# Patient Record
Sex: MALE | Race: WHITE | NOT HISPANIC OR LATINO | Employment: OTHER | URBAN - METROPOLITAN AREA
[De-identification: names, ages, dates, MRNs, and addresses within clinical notes are randomized per-mention and may not be internally consistent; named-entity substitution may affect disease eponyms.]

---

## 2017-07-18 PROBLEM — F32.0 MILD SINGLE CURRENT EPISODE OF MAJOR DEPRESSIVE DISORDER: Status: ACTIVE | Noted: 2017-07-18

## 2017-07-18 PROBLEM — E78.5 HYPERLIPIDEMIA ASSOCIATED WITH TYPE 2 DIABETES MELLITUS: Status: ACTIVE | Noted: 2017-07-18

## 2017-07-18 PROBLEM — E11.9 TYPE 2 DIABETES MELLITUS WITHOUT COMPLICATION, WITHOUT LONG-TERM CURRENT USE OF INSULIN: Status: ACTIVE | Noted: 2017-07-18

## 2017-07-18 PROBLEM — E11.69 HYPERLIPIDEMIA ASSOCIATED WITH TYPE 2 DIABETES MELLITUS: Status: ACTIVE | Noted: 2017-07-18

## 2018-04-18 PROBLEM — M17.10 ARTHRITIS OF KNEE: Status: ACTIVE | Noted: 2018-04-18

## 2018-04-18 PROBLEM — G47.00 INSOMNIA: Status: ACTIVE | Noted: 2018-04-18

## 2019-01-09 PROBLEM — E11.59 HYPERTENSION ASSOCIATED WITH DIABETES: Status: ACTIVE | Noted: 2019-01-09

## 2019-01-09 PROBLEM — I15.2 HYPERTENSION ASSOCIATED WITH DIABETES: Status: ACTIVE | Noted: 2019-01-09

## 2022-01-31 ENCOUNTER — PATIENT MESSAGE (OUTPATIENT)
Dept: ENDOSCOPY | Facility: HOSPITAL | Age: 67
End: 2022-01-31
Payer: MEDICARE

## 2022-01-31 RX ORDER — SOD SULF/POT CHLORIDE/MAG SULF 1.479 G
12 TABLET ORAL DAILY
Qty: 24 TABLET | Refills: 0 | Status: SHIPPED | OUTPATIENT
Start: 2022-01-31 | End: 2023-12-28

## 2022-02-24 ENCOUNTER — HOSPITAL ENCOUNTER (OUTPATIENT)
Facility: HOSPITAL | Age: 67
Discharge: HOME | End: 2022-02-24
Attending: INTERNAL MEDICINE | Admitting: INTERNAL MEDICINE
Payer: MEDICARE

## 2022-02-24 ENCOUNTER — ANESTHESIA (OUTPATIENT)
Dept: ENDOSCOPY | Facility: HOSPITAL | Age: 67
End: 2022-02-24
Payer: MEDICARE

## 2022-02-24 ENCOUNTER — ANESTHESIA EVENT (OUTPATIENT)
Dept: ENDOSCOPY | Facility: HOSPITAL | Age: 67
End: 2022-02-24
Payer: MEDICARE

## 2022-02-24 VITALS
SYSTOLIC BLOOD PRESSURE: 107 MMHG | OXYGEN SATURATION: 94 % | RESPIRATION RATE: 20 BRPM | WEIGHT: 245 LBS | TEMPERATURE: 99 F | HEIGHT: 74 IN | DIASTOLIC BLOOD PRESSURE: 61 MMHG | HEART RATE: 65 BPM | BODY MASS INDEX: 31.44 KG/M2

## 2022-02-24 LAB
CTP QC/QA: YES
GLUCOSE SERPL-MCNC: 143 MG/DL (ref 70–110)
SARS-COV-2 AG RESP QL IA.RAPID: NEGATIVE

## 2022-02-24 PROCEDURE — G0121 COLON CA SCRN NOT HI RSK IND: HCPCS | Mod: HCWC,,, | Performed by: INTERNAL MEDICINE

## 2022-02-24 PROCEDURE — 37000008 HC ANESTHESIA 1ST 15 MINUTES: Mod: HCWC | Performed by: INTERNAL MEDICINE

## 2022-02-24 PROCEDURE — G0121 COLON CA SCRN NOT HI RSK IND: HCPCS | Mod: HCWC | Performed by: INTERNAL MEDICINE

## 2022-02-24 PROCEDURE — 25000003 PHARM REV CODE 250: Mod: HCWC | Performed by: NURSE ANESTHETIST, CERTIFIED REGISTERED

## 2022-02-24 PROCEDURE — 63600175 PHARM REV CODE 636 W HCPCS: Mod: HCWC | Performed by: NURSE ANESTHETIST, CERTIFIED REGISTERED

## 2022-02-24 PROCEDURE — 37000009 HC ANESTHESIA EA ADD 15 MINS: Mod: HCWC | Performed by: INTERNAL MEDICINE

## 2022-02-24 PROCEDURE — G0121 COLON CA SCRN NOT HI RSK IND: ICD-10-PCS | Mod: HCWC,,, | Performed by: INTERNAL MEDICINE

## 2022-02-24 RX ORDER — LIDOCAINE HCL/PF 100 MG/5ML
SYRINGE (ML) INTRAVENOUS
Status: DISCONTINUED | OUTPATIENT
Start: 2022-02-24 | End: 2022-02-24

## 2022-02-24 RX ORDER — PROPOFOL 10 MG/ML
VIAL (ML) INTRAVENOUS
Status: DISCONTINUED | OUTPATIENT
Start: 2022-02-24 | End: 2022-02-24

## 2022-02-24 RX ADMIN — PROPOFOL 50 MG: 10 INJECTION, EMULSION INTRAVENOUS at 07:02

## 2022-02-24 RX ADMIN — PROPOFOL 50 MG: 10 INJECTION, EMULSION INTRAVENOUS at 06:02

## 2022-02-24 RX ADMIN — PROPOFOL 100 MG: 10 INJECTION, EMULSION INTRAVENOUS at 06:02

## 2022-02-24 RX ADMIN — SODIUM CHLORIDE, POTASSIUM CHLORIDE, SODIUM LACTATE AND CALCIUM CHLORIDE: 600; 310; 30; 20 INJECTION, SOLUTION INTRAVENOUS at 06:02

## 2022-02-24 RX ADMIN — LIDOCAINE HYDROCHLORIDE 50 MG: 20 INJECTION, SOLUTION INTRAVENOUS at 06:02

## 2022-02-24 NOTE — ANESTHESIA PREPROCEDURE EVALUATION
02/24/2022  Raghav Zhou is a 66 y.o., male.      Pre-op Assessment    I have reviewed the Patient Summary Reports.    I have reviewed the NPO Status.   I have reviewed the Medications.     Review of Systems  Anesthesia Hx:  Denies Family Hx of Anesthesia complications.   Denies Personal Hx of Anesthesia complications.   Social:  Non-Smoker    Hematology/Oncology:  Hematology Normal   Oncology Normal     EENT/Dental:EENT/Dental Normal   Cardiovascular:   Hypertension, well controlled ECG has been reviewed.  Hypertension, Essential Hypertension    Pulmonary:  Pulmonary Normal    Renal/:  Renal/ Normal     Hepatic/GI:  Hepatic/GI Normal    Musculoskeletal:  Musculoskeletal Normal    Neurological:  Neurology Normal    Endocrine:   Diabetes, type 2    Dermatological:  Skin Normal    Psych:   Psychiatric History          Physical Exam  General: Well nourished    Airway:  Mallampati: II   Mouth Opening: Normal  TM Distance: Normal  Neck ROM: Normal ROM    Chest/Lungs:  Clear to auscultation    Heart:  Rate: Normal  Rhythm: Regular Rhythm        Anesthesia Plan  Type of Anesthesia, risks & benefits discussed:    Anesthesia Type: MAC  Intra-op Monitoring Plan: Standard ASA Monitors  Induction:  IV  ASA Score: 2    Ready For Surgery From Anesthesia Perspective.     .

## 2022-02-24 NOTE — ANESTHESIA POSTPROCEDURE EVALUATION
Anesthesia Post Evaluation    Patient: Raghav Zhou    Procedure(s) Performed: Procedure(s) (LRB):  COLONOSCOPY (N/A)    Final Anesthesia Type: MAC      Patient location during evaluation: GI PACU  Patient participation: Yes- Able to Participate  Level of consciousness: awake and alert  Post-procedure vital signs: reviewed and stable  Pain management: adequate  Airway patency: patent    PONV status at discharge: No PONV  Anesthetic complications: no      Cardiovascular status: blood pressure returned to baseline  Respiratory status: room air, spontaneous ventilation and unassisted  Hydration status: euvolemic  Follow-up not needed.          Vitals Value Taken Time   BP  02/24/22 0735   Temp  02/24/22 0735   Pulse  02/24/22 0735   Resp  02/24/22 0735   SpO2  02/24/22 0735         No case tracking events are documented in the log.      Pain/Maxim Score: Maxim Score: 9 (2/24/2022  7:33 AM)

## 2022-02-24 NOTE — PROVATION PATIENT INSTRUCTIONS
Discharge Summary/Instructions after an Endoscopic Procedure  Patient Name: Raghav Zhou  Patient MRN: [id number]  Patient YOB: 1955 Thursday, February 24, 2022 Kayli Stevenson MD  Dear patient,  As a result of recent federal legislation (The Federal Cures Act), you may   receive lab or pathology results from your procedure in your [facility]   account before your physician is able to contact you. Your physician or   their representative will relay the results to you with their   recommendations at their soonest availability.  Thank you,  RESTRICTIONS:  During your procedure today, you received medications for sedation.  These   medications may affect your judgment, balance and coordination.  Therefore,   for 24 hours, you have the following restrictions:   - DO NOT drive a car, operate machinery, make legal/financial decisions,   sign important papers or drink alcohol.    ACTIVITY:  Today: no heavy lifting, straining or running due to procedural   sedation/anesthesia.  The following day: return to full activity including work.  DIET:  Eat and drink normally unless instructed otherwise.     TREATMENT FOR COMMON SIDE EFFECTS:  - Mild abdominal pain, nausea, belching, bloating or excessive gas:  rest,   eat lightly and use a heating pad.  - Sore Throat: treat with throat lozenges and/or gargle with warm salt   water.  - Because air was used during the procedure, expelling large amounts of air   from your rectum or belching is normal.  - If a bowel prep was taken, you may not have a bowel movement for 1-3 days.    This is normal.  SYMPTOMS TO WATCH FOR AND REPORT TO YOUR PHYSICIAN:  1. Abdominal pain or bloating, other than gas cramps.  2. Chest pain.  3. Back pain.  4. Signs of infection such as: chills or fever occurring within 24 hours   after the procedure.  5. Rectal bleeding, which would show as bright red, maroon, or black stools.   (A tablespoon of blood from the rectum is not serious, especially if    hemorrhoids are present.)  6. Vomiting.  7. Weakness or dizziness.  GO DIRECTLY TO THE NEAREST EMERGENCY ROOM IF YOU HAVE ANY OF THE FOLLOWING:      Difficulty breathing              Chills and/or fever over 101 F   Persistent vomiting and/or vomiting blood   Severe abdominal pain   Severe chest pain   Black, tarry stools   Bleeding- more than one tablespoon   Any other symptom or condition that you feel may need urgent attention  Your doctor recommends these additional instructions:  If any biopsies were taken, your doctors clinic will contact you in 1 to 2   weeks with any results.  - Patient has a contact number available for emergencies.  The signs and   symptoms of potential delayed complications were discussed with the   patient.  Return to normal activities tomorrow.  Written discharge   instructions were provided to the patient.   - Discharge patient to home (via wheelchair).   - Resume previous diet today.   - Continue present medications.   - Repeat colonoscopy in 6 months because the bowel preparation was poor.  For questions, problems or results please call your physician Kayli Stevenson MD at Work:  [phone]  If you have any questions about the above instructions, call the GI   department at [phone] or call the endoscopy unit at [phone]   from 7am until 3 pm.  [facility] [place], EMERGENCY ROOM PHONE NUMBER:   [phone]  IF A COMPLICATION OR EMERGENCY SITUATION ARISES AND YOU ARE UNABLE TO REACH   YOUR PHYSICIAN - GO DIRECTLY TO THE EMERGENCY ROOM.  I have read or have had read to me these discharge instructions for my   procedure and have received a written copy.  I understand these   instructions and will follow-up with my physician if I have any questions.     __________________________________       _____________________________________  Nurse Signature                                          Patient/Designated   Responsible Party Signature  Kayli Stevenson  MD Kayli Stevenson MD  2/24/2022 7:34:54 AM  This report has been verified and signed electronically.  Dear patient,  As a result of recent federal legislation (The Federal Cures Act), you may   receive lab or pathology results from your procedure in your [facility]   account before your physician is able to contact you. Your physician or   their representative will relay the results to you with their   recommendations at their soonest availability.  Thank you,  PROVATION

## 2022-02-24 NOTE — H&P
PRE PROCEDURE H&P    Patient Name: Raghav Zhou  MRN: [id number]  : 1955  Date of Procedure:  2022  Referring Physician: Hilario Cochran MD  Primary Physician: Hilario Cochran MD  Procedure Physician: Kayli Stevenson MD       Planned Procedure: Colonoscopy  Diagnosis: screening for colon cancer  Chief Complaint: Same as above    HPI: Patient is an 66 y.o. male is here for the above.     Last colonoscopy:   Family history: negative   Anticoagulation: none     Past Medical History:   Past Medical History:   Diagnosis Date    Hyperlipidemia     Hypertension     Type 2 diabetes mellitus without complication, without long-term current use of insulin 2017        Past Surgical History:  Past Surgical History:   Procedure Laterality Date    KNEE ARTHROSCOPY      L knee    TRIGGER FINGER RELEASE Left         Home Medications:  Prior to Admission medications    Medication Sig Start Date End Date Taking? Authorizing Provider   losartan-hydrochlorothiazide 100-25 mg (HYZAAR) 100-25 mg per tablet Take 1 tablet by mouth once daily. 21  Yes Hilario Cochran MD   metFORMIN (GLUCOPHAGE) 1000 MG tablet Take 1 tablet (1,000 mg total) by mouth 2 (two) times daily with meals. 21  Yes Hilario Cochran MD   rosuvastatin (CRESTOR) 20 MG tablet Take 1 tablet (20 mg total) by mouth every evening. 21  Yes Hilario Cochran MD   sod sulf-pot chloride-mag sulf (SUTAB) 1.479-0.188- 0.225 gram tablet Take 12 tablets by mouth once daily. Take according to package instructions with indicated amount of water. 22  Yes Ihsan Brar PA-C   traZODone (DESYREL) 150 MG tablet Take 1 tablet (150 mg total) by mouth every evening. 21 Yes Hilario Cochran MD   venlafaxine (EFFEXOR-XR) 150 MG Cp24 Take 1 capsule (150 mg total) by mouth once daily. 21  Yes Hilario Cochran MD   zolpidem (AMBIEN) 5 MG Tab Take 1 tablet (5 mg total) by mouth nightly as needed (insomnia). 22  Yes Hilario Cochran MD   aspirin  "(ECOTRIN) 81 MG EC tablet Take 1 tablet (81 mg total) by mouth once daily. 7/18/17 7/22/21  Hilario Cochran MD   sildenafiL (VIAGRA) 100 MG tablet Take 1 tablet (100 mg total) by mouth as needed for Erectile Dysfunction. 1/8/21 1/8/22  Hilario Cochran MD        Allergies:  Review of patient's allergies indicates:   Allergen Reactions    Lipitor [atorvastatin]         Social History:   Social History     Socioeconomic History    Marital status:    Tobacco Use    Smoking status: Never Smoker    Smokeless tobacco: Never Used   Substance and Sexual Activity    Alcohol use: No    Drug use: No       Family History:  History reviewed. No pertinent family history.    ROS: No acute cardiac events, no acute respiratory complaints.     Physical Exam (all patients):    BP (!) 152/81   Pulse 76   Temp 98.8 °F (37.1 °C) (Temporal)   Resp 14   Ht 6' 2" (1.88 m)   Wt 111.1 kg (245 lb)   SpO2 97%   BMI 31.46 kg/m²   Lungs: Clear to auscultation bilaterally, respirations unlabored  Heart: Regular rate and rhythm, S1 and S2 normal, no obvious murmurs  Abdomen:         Soft, non-tender, bowel sounds normal, no masses, no organomegaly    Lab Results   Component Value Date    WBC 8.4 01/24/2022    MCV 89 01/24/2022    RDW 12.4 01/24/2022     01/24/2022    INR 1.0 06/19/2015     (H) 01/24/2022    HGBA1C 6.4 (H) 01/24/2022    BUN 18 01/24/2022     01/24/2022    K 4.3 01/24/2022     01/24/2022        SEDATION PLAN: per anesthesia      History reviewed, vital signs satisfactory, cardiopulmonary status satisfactory, sedation options, risks and plans have been discussed with the patient  All their questions were answered and the patient agrees to the sedation procedures as planned and the patient is deemed an appropriate candidate for the sedation as planned.    Procedure explained to patient, informed consent obtained and placed in chart.    Kayli Stevenson  2/24/2022  6:51 AM   "

## 2022-02-24 NOTE — TRANSFER OF CARE
"Anesthesia Transfer of Care Note    Patient: Raghav Zhou    Procedure(s) Performed: Procedure(s) (LRB):  COLONOSCOPY (N/A)    Patient location: GI    Anesthesia Type: MAC    Transport from OR: Transported from OR on room air with adequate spontaneous ventilation    Post pain: adequate analgesia    Post assessment: no apparent anesthetic complications    Post vital signs: stable    Level of consciousness: awake and alert    Nausea/Vomiting: no nausea/vomiting    Complications: none    Transfer of care protocol was followed      Last vitals:   Visit Vitals  BP (!) 152/81   Pulse 76   Temp 37.1 °C (98.8 °F) (Temporal)   Resp 14   Ht 6' 2" (1.88 m)   Wt 111.1 kg (245 lb)   SpO2 97%   BMI 31.46 kg/m²     "

## 2022-03-29 LAB — POCT GLUCOSE: 143 MG/DL (ref 70–110)

## 2022-04-07 ENCOUNTER — OFFICE VISIT (OUTPATIENT)
Dept: OPHTHALMOLOGY | Facility: CLINIC | Age: 67
End: 2022-04-07
Payer: MEDICARE

## 2022-04-07 DIAGNOSIS — E11.9 DIABETES MELLITUS TYPE 2 WITHOUT RETINOPATHY: Primary | ICD-10-CM

## 2022-04-07 DIAGNOSIS — E11.36 DIABETIC CATARACT: ICD-10-CM

## 2022-04-07 DIAGNOSIS — H52.7 REFRACTIVE ERRORS: ICD-10-CM

## 2022-04-07 DIAGNOSIS — Z46.0 ENCOUNTER FOR FITTING OR ADJUSTMENT OF SPECTACLES OR CONTACT LENSES: ICD-10-CM

## 2022-04-07 PROCEDURE — 1159F PR MEDICATION LIST DOCUMENTED IN MEDICAL RECORD: ICD-10-PCS | Mod: CPTII,S$GLB,, | Performed by: OPTOMETRIST

## 2022-04-07 PROCEDURE — 92015 DETERMINE REFRACTIVE STATE: CPT | Mod: S$GLB,,, | Performed by: OPTOMETRIST

## 2022-04-07 PROCEDURE — 3061F PR NEG MICROALBUMINURIA RESULT DOCUMENTED/REVIEW: ICD-10-PCS | Mod: CPTII,S$GLB,, | Performed by: OPTOMETRIST

## 2022-04-07 PROCEDURE — 92015 PR REFRACTION: ICD-10-PCS | Mod: S$GLB,,, | Performed by: OPTOMETRIST

## 2022-04-07 PROCEDURE — 3066F NEPHROPATHY DOC TX: CPT | Mod: CPTII,S$GLB,, | Performed by: OPTOMETRIST

## 2022-04-07 PROCEDURE — 3066F PR DOCUMENTATION OF TREATMENT FOR NEPHROPATHY: ICD-10-PCS | Mod: CPTII,S$GLB,, | Performed by: OPTOMETRIST

## 2022-04-07 PROCEDURE — 99999 PR PBB SHADOW E&M-EST. PATIENT-LVL I: ICD-10-PCS | Mod: PBBFAC,,, | Performed by: OPTOMETRIST

## 2022-04-07 PROCEDURE — 92004 COMPRE OPH EXAM NEW PT 1/>: CPT | Mod: S$GLB,,, | Performed by: OPTOMETRIST

## 2022-04-07 PROCEDURE — 2023F DILAT RTA XM W/O RTNOPTHY: CPT | Mod: CPTII,S$GLB,, | Performed by: OPTOMETRIST

## 2022-04-07 PROCEDURE — 92004 PR EYE EXAM, NEW PATIENT,COMPREHESV: ICD-10-PCS | Mod: S$GLB,,, | Performed by: OPTOMETRIST

## 2022-04-07 PROCEDURE — 1159F MED LIST DOCD IN RCRD: CPT | Mod: CPTII,S$GLB,, | Performed by: OPTOMETRIST

## 2022-04-07 PROCEDURE — 92310 CONTACT LENS FITTING OU: CPT | Mod: CSM,S$GLB,, | Performed by: OPTOMETRIST

## 2022-04-07 PROCEDURE — 3061F NEG MICROALBUMINURIA REV: CPT | Mod: CPTII,S$GLB,, | Performed by: OPTOMETRIST

## 2022-04-07 PROCEDURE — 2023F PR DILATED RETINAL EXAM W/O EVID OF RETINOPATHY: ICD-10-PCS | Mod: CPTII,S$GLB,, | Performed by: OPTOMETRIST

## 2022-04-07 PROCEDURE — 92310 PR CONTACT LENS FITTING (NO CHANGE): ICD-10-PCS | Mod: CSM,S$GLB,, | Performed by: OPTOMETRIST

## 2022-04-07 PROCEDURE — 99999 PR PBB SHADOW E&M-EST. PATIENT-LVL I: CPT | Mod: PBBFAC,,, | Performed by: OPTOMETRIST

## 2022-04-07 NOTE — PROGRESS NOTES
HPI     New Patient Annual Diabetic Eye Exam   No Visual Complaints   Update Glasses and Contacts Rx  .Lab Results       Component                Value               Date                       LABA1C                   7.1                 02/03/2017                 HGBA1C                   6.4 (H)             01/24/2022                Last edited by Rishi Banerjee, OD on 4/7/2022  4:18 PM. (History)            Assessment /Plan     For exam results, see Encounter Report.    Diabetes mellitus type 2 without retinopathy    Diabetic cataract    Encounter for fitting or adjustment of spectacles or contact lenses    Refractive errors      No Background Diabetic Retinopathy    Moderate cataracts OU, not surgical    Discussed CL charges.  Dispense Final Rx for glasses  No changes CL Rx  RTC 1 year  Discussed above and answered questions.

## 2022-04-21 DIAGNOSIS — M99.01 CERVICAL (NECK) REGION SOMATIC DYSFUNCTION: Primary | ICD-10-CM

## 2022-04-21 DIAGNOSIS — M54.50 LUMBAR PAIN: ICD-10-CM

## 2022-04-21 DIAGNOSIS — M25.519 SHOULDER PAIN: ICD-10-CM

## 2022-05-02 ENCOUNTER — CLINICAL SUPPORT (OUTPATIENT)
Dept: REHABILITATION | Facility: HOSPITAL | Age: 67
End: 2022-05-02
Attending: ORTHOPAEDIC SURGERY
Payer: MEDICARE

## 2022-05-02 DIAGNOSIS — R52 PAIN: ICD-10-CM

## 2022-05-02 DIAGNOSIS — M99.01 CERVICAL (NECK) REGION SOMATIC DYSFUNCTION: ICD-10-CM

## 2022-05-02 PROCEDURE — 97161 PT EVAL LOW COMPLEX 20 MIN: CPT

## 2022-05-02 NOTE — PLAN OF CARE
[facility]  Physical Therapy Initial Evaluation    Name: Raghav Zhou  Clinic Number: [id number]    Therapy Diagnosis:   Encounter Diagnoses   Name Primary?    Cervical (neck) region somatic dysfunction     Pain      Physician: Lazaro Sanchez MD    Physician Orders: PT Eval and Treat   Medical Diagnosis from Referral:Cervical (neck) region somatic dysfunction  Evaluation Date: 5/2/2022  Authorization Period Expiration:   05/20/2022  Plan of Care Expiration:7/15/22  Visit # / Visits authorized: 1/ 1  FOTO: 1/3  Precautions: Standard, Standard    Time In: 3:17  Time Out:3: 56  Total Billable Time:39  minutes (low Complexity Evaluation, Therapeutic Exercise 5 minutes      Subjective   Date of onset: 15 years or longer  History of current condition - Raghav reports: L shoulder pain, neck pain, and pain in scapula area. Patient reports L UE goes numb sometimes into fingers. Patient reports numbness is getting worse. Patient reports his sleep is disturbed at times. 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  Medical History:   Past Medical History:   Diagnosis Date    Hyperlipidemia     Hypertension     Type 2 diabetes mellitus without complication, without long-term current use of insulin 7/18/2017       Surgical History:   Raghav Zhou  has a past surgical history that includes Knee arthroscopy; Trigger finger release (Left); and Colonoscopy (N/A, 2/24/2022).    Medications:   Raghav has a current medication list which includes the following prescription(s): aspirin, losartan-hydrochlorothiazide 100-25 mg, metformin, rosuvastatin, sildenafil, sutab, trazodone, venlafaxine, and zolpidem.    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  Prior Level of Function: no activity change reported  Current Level of Function: pain has gotten worse and radicular symptoms as well    Pain:   Current 1/10, worst 7/10, best 0/10   Location: left shoulder  and scapula   Description: Aching and Dull  Aggravating Factors: Lifting and SL both directions  Easing Factors: stretching into extension    Pts goals: decrease pain,  Increase endurance    Objective     Posture: R hand dominant, forward head, rounding shoulders, decrease lumbar lordosis, B genu recurvatum, pecoralis shortening , R scapula winging  Palpation: TTP suboccipital musculature  Sensation: intact to light touch B UE's/ reports numbness/ tingling in L UE at times  Range of Motion/Strength:   CERVICAL AROM Pain/Dysfunction with Movement   Flexion WNL's    Extension WFL's Pulling L side of neck   Right side bending 20    Left side bending 20 pain   Right rotation 75    Left rotation 65 Mild pain       Shoulder Right Left Pain/Dysfunction with Movement   AROM      flexion  WFL's WFL's    abduction WFL's  WFL's    Internal rotation  WFL's  WFL's L painful   ER at 90° abd  WFL's  WFL's        U/E MMT Right Left Pain/Dysfunction with Movement   Shoulder Flexion 4/5 4/5    Shoulder Abduction 5/5 5/5    Shoulder IR 5/5 5/5    Shoulder ER  @ 0* Abduction 5/5 5/5    Rhomboids 5/5 3+/5    Mid Traps 4/5 4/5    Low Traps 4-/5 4-/5    Serratus anterior R 5/5, L 4/5      Special Tests: compression (-), distraction felt good per patient      CMS Impairment/Limitation/Restriction for FOTO shoulder Survey    Therapist reviewed FOTO scores for Raghav Abelardo on 5/2/2022.   FOTO documents entered into [employer] - see Media section.    Limitation Score: 40%         TREATMENT   Treatment Time In: 3:17  Treatment Time Out:3:56  Total Treatment time separate from Evaluation: 5 minutes    Raghav received therapeutic exercises to develop ROM, flexibility, posture and core stabilization for 5 minutes including:  Chin  tucks, shoulder rolls, scapula squeezes  Goals and POC reviewed with patient  Questions and concerns addressed      Home Exercises Provided and Patient Education Provided       Education/Self-Care provided: (5) minutes   Patient educated on the impairments noted above and the effects of physical therapy intervention to improve overall condition and QOL.    Patient was educated on all the above exercise prior/during/after for proper posture, positioning, and execution for safe performance with home exercise program.       Written Home Exercises Provided: yes.  Exercises were reviewed and Raghav was able to demonstrate them prior to the end of the session.  Raghav demonstrated good  understanding of the education provided.     See EMR under Patient Instructions for exercises provided 5/2/2022.      Assessment   Raghav is a 66 y.o. male referred to outpatient Physical Therapy with a medical diagnosis of Cervical (neck) region somatic dysfunction. Pt presents with pain, ROM limitations, weakness, and posture deficits. Patient would benefit from skilled PT intervention for pain management, ROM, posture education, and posture strengthening to improve quality of life.     Pt prognosis is Fair.   Pt will benefit from skilled outpatient Physical Therapy to address the deficits stated above and in the chart below, provide pt/family education, and to maximize pt's level of independence.     Plan of care discussed with patient: Yes  Pt's spiritual, cultural and educational needs considered and patient is agreeable to the plan of care and goals as stated below:     Anticipated Barriers for therapy: pain/ compliance    Medical Necessity is demonstrated by the following  History  Co-morbidities and personal factors that may impact the plan of care Co-morbidities:   See PMHX    Personal Factors:   no deficits     low   Examination  Body Structures and Functions, activity limitations and participation restrictions that may impact  the plan of care Body Regions:   neck  back  lower extremities    Body Systems:    gross symmetry  ROM  strength    Participation Restrictions:   pain    Activity limitations:   Learning and applying knowledge  no deficits    General Tasks and Commands  no deficits    Communication  no deficits    Mobility  lifting and carrying objects    Self care  no deficits    Domestic Life  no deficits    Interactions/Relationships  no deficits    Life Areas  no deficits    Community and Social Life  no deficits         low   Clinical Presentation stable and uncomplicated low   Decision Making/ Complexity Score: low       Goals:  STG's 2 weeks  1. Patient will be independent with 50% of HEP    LTG's 10  weeks  1.Patient will improve FOTO disability score  to 32 %  disability or less in order to improve overall QOL & return to PLOF   2. Patient will report an overall decrease in pain with ADL's and functional mobility  3.Patient will increase strength by at least 1/2 muscle grade in affected musculature to   improve functional mobility  4. Patient will improve L  Cervical rotation  ROM by 10 degrees to improve functional mobility and ADL's  5.Patient will be more aware of posture throughout the day to reduce stress  and maintain optimal alignment of the spine  6. Patient will be independent with HEP  7. Patient will have no difficulty reaching a shelf at shoulder height  Plan   Plan of care Certification: 5/2/2022 to 7/15/22.    Outpatient Physical Therapy 2 times weekly for 10 weeks to include the following interventions: Cervical/Lumbar Traction, Electrical Stimulation PRN, Manual Therapy, Moist Heat/ Ice, Patient Education, Self Care, Therapeutic Activities, Therapeutic Exercise and Ultrasound, ASTYM, Kinesiotaping PRN, Functional Dry Needling    Celestina Bell, PT

## 2022-05-05 ENCOUNTER — CLINICAL SUPPORT (OUTPATIENT)
Dept: CARDIOLOGY | Facility: CLINIC | Age: 67
End: 2022-05-05
Payer: MEDICARE

## 2022-05-05 ENCOUNTER — OFFICE VISIT (OUTPATIENT)
Dept: CARDIOLOGY | Facility: CLINIC | Age: 67
End: 2022-05-05
Payer: MEDICARE

## 2022-05-05 ENCOUNTER — TELEPHONE (OUTPATIENT)
Dept: CARDIOLOGY | Facility: CLINIC | Age: 67
End: 2022-05-05
Payer: MEDICARE

## 2022-05-05 VITALS
BODY MASS INDEX: 31.45 KG/M2 | HEART RATE: 83 BPM | WEIGHT: 245.06 LBS | SYSTOLIC BLOOD PRESSURE: 138 MMHG | DIASTOLIC BLOOD PRESSURE: 76 MMHG | HEIGHT: 74 IN | OXYGEN SATURATION: 97 % | RESPIRATION RATE: 16 BRPM

## 2022-05-05 DIAGNOSIS — R06.09 DOE (DYSPNEA ON EXERTION): ICD-10-CM

## 2022-05-05 DIAGNOSIS — I15.2 HYPERTENSION ASSOCIATED WITH DIABETES: ICD-10-CM

## 2022-05-05 DIAGNOSIS — E66.09 CLASS 1 OBESITY DUE TO EXCESS CALORIES WITH SERIOUS COMORBIDITY AND BODY MASS INDEX (BMI) OF 31.0 TO 31.9 IN ADULT: ICD-10-CM

## 2022-05-05 DIAGNOSIS — Z76.89 ENCOUNTER TO ESTABLISH CARE: ICD-10-CM

## 2022-05-05 DIAGNOSIS — E78.5 HYPERLIPIDEMIA ASSOCIATED WITH TYPE 2 DIABETES MELLITUS: ICD-10-CM

## 2022-05-05 DIAGNOSIS — R94.31 ABNORMAL EKG: Primary | ICD-10-CM

## 2022-05-05 DIAGNOSIS — R94.31 ABNORMAL ECG: ICD-10-CM

## 2022-05-05 DIAGNOSIS — Z82.49 FAMILY HISTORY OF CARDIOVASCULAR DISEASE: ICD-10-CM

## 2022-05-05 DIAGNOSIS — Z76.89 ENCOUNTER TO ESTABLISH CARE: Primary | ICD-10-CM

## 2022-05-05 DIAGNOSIS — E11.59 HYPERTENSION ASSOCIATED WITH DIABETES: ICD-10-CM

## 2022-05-05 DIAGNOSIS — E11.69 HYPERLIPIDEMIA ASSOCIATED WITH TYPE 2 DIABETES MELLITUS: ICD-10-CM

## 2022-05-05 DIAGNOSIS — E11.9 TYPE 2 DIABETES MELLITUS WITHOUT COMPLICATION, WITHOUT LONG-TERM CURRENT USE OF INSULIN: ICD-10-CM

## 2022-05-05 PROCEDURE — 3066F NEPHROPATHY DOC TX: CPT | Mod: CPTII,S$GLB,, | Performed by: INTERNAL MEDICINE

## 2022-05-05 PROCEDURE — 3066F PR DOCUMENTATION OF TREATMENT FOR NEPHROPATHY: ICD-10-PCS | Mod: CPTII,S$GLB,, | Performed by: INTERNAL MEDICINE

## 2022-05-05 PROCEDURE — 3075F PR MOST RECENT SYSTOLIC BLOOD PRESS GE 130-139MM HG: ICD-10-PCS | Mod: CPTII,S$GLB,, | Performed by: INTERNAL MEDICINE

## 2022-05-05 PROCEDURE — 3008F PR BODY MASS INDEX (BMI) DOCUMENTED: ICD-10-PCS | Mod: CPTII,S$GLB,, | Performed by: INTERNAL MEDICINE

## 2022-05-05 PROCEDURE — 99205 PR OFFICE/OUTPT VISIT, NEW, LEVL V, 60-74 MIN: ICD-10-PCS | Mod: S$GLB,,, | Performed by: INTERNAL MEDICINE

## 2022-05-05 PROCEDURE — 93010 ELECTROCARDIOGRAM REPORT: CPT | Mod: S$GLB,,, | Performed by: STUDENT IN AN ORGANIZED HEALTH CARE EDUCATION/TRAINING PROGRAM

## 2022-05-05 PROCEDURE — 93005 ELECTROCARDIOGRAM TRACING: CPT | Mod: S$GLB,,, | Performed by: INTERNAL MEDICINE

## 2022-05-05 PROCEDURE — 93005 EKG 12-LEAD: ICD-10-PCS | Mod: S$GLB,,, | Performed by: INTERNAL MEDICINE

## 2022-05-05 PROCEDURE — 1126F PR PAIN SEVERITY QUANTIFIED, NO PAIN PRESENT: ICD-10-PCS | Mod: CPTII,S$GLB,, | Performed by: INTERNAL MEDICINE

## 2022-05-05 PROCEDURE — 3008F BODY MASS INDEX DOCD: CPT | Mod: CPTII,S$GLB,, | Performed by: INTERNAL MEDICINE

## 2022-05-05 PROCEDURE — 99205 OFFICE O/P NEW HI 60 MIN: CPT | Mod: S$GLB,,, | Performed by: INTERNAL MEDICINE

## 2022-05-05 PROCEDURE — 1101F PR PT FALLS ASSESS DOC 0-1 FALLS W/OUT INJ PAST YR: ICD-10-PCS | Mod: CPTII,S$GLB,, | Performed by: INTERNAL MEDICINE

## 2022-05-05 PROCEDURE — 1159F PR MEDICATION LIST DOCUMENTED IN MEDICAL RECORD: ICD-10-PCS | Mod: CPTII,S$GLB,, | Performed by: INTERNAL MEDICINE

## 2022-05-05 PROCEDURE — 99999 PR PBB SHADOW E&M-EST. PATIENT-LVL III: CPT | Mod: PBBFAC,,, | Performed by: INTERNAL MEDICINE

## 2022-05-05 PROCEDURE — 1160F PR REVIEW ALL MEDS BY PRESCRIBER/CLIN PHARMACIST DOCUMENTED: ICD-10-PCS | Mod: CPTII,S$GLB,, | Performed by: INTERNAL MEDICINE

## 2022-05-05 PROCEDURE — 1126F AMNT PAIN NOTED NONE PRSNT: CPT | Mod: CPTII,S$GLB,, | Performed by: INTERNAL MEDICINE

## 2022-05-05 PROCEDURE — 3288F PR FALLS RISK ASSESSMENT DOCUMENTED: ICD-10-PCS | Mod: CPTII,S$GLB,, | Performed by: INTERNAL MEDICINE

## 2022-05-05 PROCEDURE — 3288F FALL RISK ASSESSMENT DOCD: CPT | Mod: CPTII,S$GLB,, | Performed by: INTERNAL MEDICINE

## 2022-05-05 PROCEDURE — 3075F SYST BP GE 130 - 139MM HG: CPT | Mod: CPTII,S$GLB,, | Performed by: INTERNAL MEDICINE

## 2022-05-05 PROCEDURE — 3061F NEG MICROALBUMINURIA REV: CPT | Mod: CPTII,S$GLB,, | Performed by: INTERNAL MEDICINE

## 2022-05-05 PROCEDURE — 3078F PR MOST RECENT DIASTOLIC BLOOD PRESSURE < 80 MM HG: ICD-10-PCS | Mod: CPTII,S$GLB,, | Performed by: INTERNAL MEDICINE

## 2022-05-05 PROCEDURE — 99999 PR PBB SHADOW E&M-EST. PATIENT-LVL III: ICD-10-PCS | Mod: PBBFAC,,, | Performed by: INTERNAL MEDICINE

## 2022-05-05 PROCEDURE — 3061F PR NEG MICROALBUMINURIA RESULT DOCUMENTED/REVIEW: ICD-10-PCS | Mod: CPTII,S$GLB,, | Performed by: INTERNAL MEDICINE

## 2022-05-05 PROCEDURE — 1159F MED LIST DOCD IN RCRD: CPT | Mod: CPTII,S$GLB,, | Performed by: INTERNAL MEDICINE

## 2022-05-05 PROCEDURE — 93010 EKG 12-LEAD: ICD-10-PCS | Mod: S$GLB,,, | Performed by: STUDENT IN AN ORGANIZED HEALTH CARE EDUCATION/TRAINING PROGRAM

## 2022-05-05 PROCEDURE — 3078F DIAST BP <80 MM HG: CPT | Mod: CPTII,S$GLB,, | Performed by: INTERNAL MEDICINE

## 2022-05-05 PROCEDURE — 1101F PT FALLS ASSESS-DOCD LE1/YR: CPT | Mod: CPTII,S$GLB,, | Performed by: INTERNAL MEDICINE

## 2022-05-05 PROCEDURE — 1160F RVW MEDS BY RX/DR IN RCRD: CPT | Mod: CPTII,S$GLB,, | Performed by: INTERNAL MEDICINE

## 2022-05-05 NOTE — PROGRESS NOTES
[facility]   Physical Therapy Treatment Note     Name: Raghav Zhou  Clinic Number: [id number]    Therapy Diagnosis:   Encounter Diagnosis   Name Primary?    Pain Yes     Physician: Lazaro Sanchez MD    Visit Date: 5/6/2022    Physician Orders: PT Eval and Treat   Medical Diagnosis from Referral:Cervical (neck) region somatic dysfunction  Evaluation Date: 5/2/2022  Authorization Period Expiration:   05/20/2022  Plan of Care Expiration:7/15/22  Visit # / Visits authorized: 1/ 25 + eval  FOTO: 1/3   Precautions: Standard, Standard    PTA Visit #: 0/5     Time In: 10:12  Time Out: 11:01  Total Billable Time: 49 minutes    SUBJECTIVE     Pt reports: he has no new complaints and has enjoyed doing HEP. Patient reports he had some pain when he woke up this am. Patient reports a history of bone spurs in L shoulder.  He was compliant with home exercise program.  Response to previous treatment: HEP helping with pain  Functional change: n/a at this time    Pain: 0/10  Location: none today     OBJECTIVE     Objective Measures updated at progress report unless specified.     Treatment     Raghav received the treatments listed below:      therapeutic exercises to develop strength, endurance, ROM, flexibility, posture and core stabilization for 39 minutes including:  Review HEP chin tucks, shoulder rolls, scapula squeezes  UBE for upright posture 3' F/ 3' B  Posture education in sitting and standing/ body mechanics  Open books 1 x 15 reps B  Serratus punches with 3# 2 x 15 reps  Shoulder extension with 3# 2 x 15 reps  Rows 30# 2 x 15 reps  Chest pulls with G TB 2 x 10 reps  Diagonals with G TB 1 x 15 reps B  Wall push ups with a plus 2 x 10 reps  Pectoralis stretch in supine/ corner wall stretch      manual therapy techniques: Joint mobilizations and Myofacial release were applied to the: L shoulder for 10 minutes, including:  Joint mobs  MFR  Scapula mobs/ RS  Pectoralis stretching        Patient  Education and Home Exercises     Home Exercises Provided and Patient Education Provided     Education provided:   - Posture education in sitting and standing  - Body mechanics    Written Home Exercises Provided: Patient instructed to cont prior HEP. Exercises were reviewed and Raghav was able to demonstrate them prior to the end of the session.  Raghav demonstrated good  understanding of the education provided. See EMR under Patient Instructions for exercises provided during therapy sessions    ASSESSMENT     First visit after initial evaluation. New exercises initiated today. Patient tolerated treatment well with minimal  complaints of pain L scapula after corner wall stretch. He responded well to manual therapy. Posture education provided in sitting and standing. Patient demonstrated and verbalized understanding.     Raghav Is progressing well towards his goals.   Pt prognosis is Fair.     Pt will continue to benefit from skilled outpatient physical therapy to address the deficits listed in the problem list box on initial evaluation, provide pt/family education and to maximize pt's level of independence in the home and community environment.     Pt's spiritual, cultural and educational needs considered and pt agreeable to plan of care and goals.     Anticipated barriers to physical therapy: pain, chronicity of problem, work demands    Goals: STG's 2 weeks  1. Patient will be independent with 50% of HEP Progressing     LTG's 10  weeks  1.Patient will improve FOTO disability score  to 32 %  disability or less in order to improve overall QOL & return to PLOF   2. Patient will report an overall decrease in pain with ADL's and functional mobility  3.Patient will increase strength by at least 1/2 muscle grade in affected musculature to   improve functional mobility  4. Patient will improve L  Cervical rotation  ROM by 10 degrees to improve functional mobility and ADL's  5.Patient will be more aware of posture throughout  the day to reduce stress  and maintain optimal alignment of the spine  6. Patient will be independent with HEP  7. Patient will have no difficulty reaching a shelf at shoulder height    PLAN     Plan of care Certification: 5/2/2022 to 7/15/22.     Outpatient Physical Therapy 2 times weekly for 10 weeks to include the following interventions: Cervical/Lumbar Traction, Electrical Stimulation PRN, Manual Therapy, Moist Heat/ Ice, Patient Education, Self Care, Therapeutic Activities, Therapeutic Exercise and Ultrasound, ASTYM, Kinesiotaping PRN, Functional Dry Needling    Celestina Bell, PT

## 2022-05-05 NOTE — PROGRESS NOTES
Subjective:    Patient ID:  Raghav Zhou is a 66 y.o. male who presents for evaluation of Abnormal ECG      Pt referred by Dr. Hilario Cochran      HPI  Pt presents for evaluation of abnl ecg.  His current med conditions include DM, HTN, obesity, hyperlipidemia.  Nonsmoker.  Father had MI in 50s, but was smoker.   Pt is realtor.  Pt saw cardiologist 15 years ago for dyspnea and had normal cath.  Sxs attributed to anxiety.  ecg June and July 2015 was normal.   ecg today 5/5/22 NSR, normal ECG.  ecg done by PCP 1/24/22 personally reviewed: NSR, nonspecific septal Q waves V1-V2 that are most likely lead positioning error.  No cp sxs.  ROJAS chronic, stable.   DM HGAIC at goal.  Lipids controlled well on statin tx.  HTN controlled.  Limited on exercise with arthritis, knees/hips.       Past Medical History:   Diagnosis Date    Hyperlipidemia     Hypertension     Type 2 diabetes mellitus without complication, without long-term current use of insulin 7/18/2017     Current Outpatient Medications   Medication Instructions    aspirin (ECOTRIN) 81 mg, Oral, Daily    losartan-hydrochlorothiazide 100-25 mg (HYZAAR) 100-25 mg per tablet 1 tablet, Oral, Daily    metFORMIN (GLUCOPHAGE) 1000 MG tablet TAKE 1 TABLET TWICE DAILY WITH MEALS    rosuvastatin (CRESTOR) 20 mg, Oral, Nightly    sildenafiL (VIAGRA) 100 mg, Oral, As needed (PRN)    sod sulf-pot chloride-mag sulf (SUTAB) 1.479-0.188- 0.225 gram tablet 12 tablets, Oral, Daily, Take according to package instructions with indicated amount of water.    traZODone (DESYREL) 150 mg, Oral, Nightly    venlafaxine (EFFEXOR-XR) 150 mg, Oral, Daily    zolpidem (AMBIEN) 5 mg, Oral, Nightly PRN         Review of Systems   Constitutional: Negative.   HENT: Negative.    Eyes: Negative.    Cardiovascular: Positive for dyspnea on exertion.   Respiratory: Positive for shortness of breath.    Endocrine: Negative.    Hematologic/Lymphatic: Negative.    Skin: Negative.    Musculoskeletal:  "Positive for arthritis and joint pain.   Gastrointestinal: Negative.    Genitourinary: Negative.    Neurological: Negative.    Psychiatric/Behavioral: Negative.    Allergic/Immunologic: Negative.        /76 (BP Location: Right arm, Patient Position: Sitting, BP Method: Medium (Manual))   Pulse 83   Resp 16   Ht 6' 2" (1.88 m)   Wt 111.1 kg (245 lb 0.7 oz)   SpO2 97%   BMI 31.46 kg/m²     Wt Readings from Last 3 Encounters:   05/05/22 111.1 kg (245 lb 0.7 oz)   02/24/22 111.1 kg (245 lb)   01/24/22 113.9 kg (251 lb)     Temp Readings from Last 3 Encounters:   02/24/22 98.8 °F (37.1 °C) (Temporal)   07/08/20 98.4 °F (36.9 °C) (Oral)   11/13/19 97.9 °F (36.6 °C) (Oral)     BP Readings from Last 3 Encounters:   05/05/22 138/76   02/24/22 107/61   01/24/22 136/78     Pulse Readings from Last 3 Encounters:   05/05/22 83   02/24/22 65   01/24/22 71          Objective:    Physical Exam  Vitals and nursing note reviewed.   Constitutional:       Appearance: He is well-developed.   HENT:      Head: Normocephalic.   Neck:      Thyroid: No thyromegaly.      Vascular: Normal carotid pulses. No carotid bruit, hepatojugular reflux or JVD.   Cardiovascular:      Rate and Rhythm: Normal rate and regular rhythm.      Chest Wall: PMI is not displaced.      Pulses:           Radial pulses are 2+ on the right side and 2+ on the left side.      Heart sounds: S1 normal and S2 normal. Heart sounds not distant. No midsystolic click and no opening snap. No murmur heard.    No friction rub. No S3 or S4 sounds.   Pulmonary:      Effort: Pulmonary effort is normal.      Breath sounds: Normal breath sounds. No wheezing or rales.   Abdominal:      General: Bowel sounds are normal. There is no distension or abdominal bruit.      Palpations: Abdomen is soft. There is no mass.      Tenderness: There is no abdominal tenderness.   Musculoskeletal:      Cervical back: Normal range of motion and neck supple.   Skin:     General: Skin is warm. "   Neurological:      Mental Status: He is alert and oriented to person, place, and time.   Psychiatric:         Behavior: Behavior normal.       I have reviewed all pertinent labs and cardiac studies.        Chemistry        Component Value Date/Time     01/24/2022 0731    K 4.3 01/24/2022 0731     01/24/2022 0731    CO2 25 01/24/2022 0731    BUN 18 01/24/2022 0731    CREATININE 1.15 01/24/2022 0731     (H) 01/24/2022 0731        Component Value Date/Time    CALCIUM 10.6 (H) 01/24/2022 0731    ALKPHOS 70 01/07/2020 0725    AST 20 01/24/2022 0731    ALT 23 01/24/2022 0731    BILITOT 1.3 (H) 01/24/2022 0731    ESTGFRAFRICA >60 06/19/2015 0335    EGFRNONAA 66 01/24/2022 0731        Lab Results   Component Value Date    WBC 8.4 01/24/2022    HGB 16.7 01/24/2022    HCT 50.1 01/24/2022    MCV 89 01/24/2022     01/24/2022       Lab Results   Component Value Date    LABA1C 7.1 02/03/2017    HGBA1C 6.4 (H) 01/24/2022       Lab Results   Component Value Date    CHOL 135 01/24/2022    CHOL 117 01/08/2021    CHOL 132 01/07/2020     Lab Results   Component Value Date    HDL 51 01/24/2022    HDL 52 01/08/2021    HDL 56 01/07/2020     Lab Results   Component Value Date    LDLCALC 64 01/24/2022    LDLCALC 44 01/08/2021    LDLCALC 54 01/07/2020     Lab Results   Component Value Date    TRIG 113 01/24/2022    TRIG 116 01/08/2021    TRIG 111 01/07/2020     Lab Results   Component Value Date    CHOLHDL 20.3 10/29/2009    CHOLHDL 18.6 (L) 08/26/2008               Assessment:       1. Abnormal EKG    2. ROJAS (dyspnea on exertion)    3. Type 2 diabetes mellitus without complication, without long-term current use of insulin    4. Hypertension associated with diabetes    5. Hyperlipidemia associated with type 2 diabetes mellitus    6. Abnormal ECG    7. Family history of cardiovascular disease    8. Class 1 obesity due to excess calories with serious comorbidity and body mass index (BMI) of 31.0 to 31.9 in adult          Plan:             At risk for CAD.  Chronic ROJAS, stable.  Ecg abnormality at PCP office due to lead positioning error.  Would benefit from ischemia evaluation though with numerous risk factors for CAD.  Stress MPI.  Echocardiogram.  Discussed possible left heart catheterization with possible PCI if warranted should stress test and/or echo reveal evidence of coronary ischemia and/or CHF.  Pt advised of all risks and benefits of procedure.  Pt advised of alternative approaches and treatment strategies to include medical therapy, PCI as well as surgical revascularization with possible CABG.  All questions answered in clinic.   Risk factor modification discussed.  Reviewed all tests and above medical conditions with patient in detail and formulated treatment plan.  Cardiac low salt diet advised.  Daily exercise encouraged, with the goal 30 +  minutes aerobic exercise as tolerated.  Maintaining healthy weight and weight loss goals (if needed) were discussed in clinic.  Need for BP control and HTN goals (if needed) were discussed and tx plan formulated.  Goal BP < 130/80.  Continue current HTN meds.  Importance of optimal lipid control were discussed in detail as well as possible pharmacologic and lifestyle changes that may be needed.  Continue statin.  Appropriate DM HGAIC control discussed.    PHONE REVIEW.      I have reviewed all pertinent labs and cardiac studies independently. Plans and recommendations have been formulated under my direct supervision. All questions answered and patient voiced understanding.

## 2022-05-06 ENCOUNTER — CLINICAL SUPPORT (OUTPATIENT)
Dept: REHABILITATION | Facility: HOSPITAL | Age: 67
End: 2022-05-06
Payer: MEDICARE

## 2022-05-06 ENCOUNTER — TELEPHONE (OUTPATIENT)
Dept: CARDIOLOGY | Facility: HOSPITAL | Age: 67
End: 2022-05-06
Payer: MEDICARE

## 2022-05-06 DIAGNOSIS — R52 PAIN: Primary | ICD-10-CM

## 2022-05-06 PROCEDURE — 97110 THERAPEUTIC EXERCISES: CPT

## 2022-05-06 NOTE — TELEPHONE ENCOUNTER
Spoke with patient. Patient agreed ; Nuclear Stress Test start time of 11:45, Echocardiogram at 1300, both at [facility] location.

## 2022-05-06 NOTE — TELEPHONE ENCOUNTER
----- Message from Semaj Guallpa sent at 5/6/2022 12:11 PM CDT -----  Contact: PT  Calling to schedule his Echo and his stress test. Has requests for them. Call back at [phone].

## 2022-05-06 NOTE — TELEPHONE ENCOUNTER
----- Message from Semaj Guallpa sent at 5/6/2022 12:11 PM CDT -----  Contact: PT  Calling to schedule his Echo and his stress test. Has requests for them. Call back at [phone].

## 2022-05-09 ENCOUNTER — CLINICAL SUPPORT (OUTPATIENT)
Dept: REHABILITATION | Facility: HOSPITAL | Age: 67
End: 2022-05-09
Payer: MEDICARE

## 2022-05-09 DIAGNOSIS — R52 PAIN: Primary | ICD-10-CM

## 2022-05-09 PROCEDURE — 97140 MANUAL THERAPY 1/> REGIONS: CPT | Mod: CQ

## 2022-05-09 PROCEDURE — 97110 THERAPEUTIC EXERCISES: CPT | Mod: CQ

## 2022-05-09 NOTE — PROGRESS NOTES
[facility]   Physical Therapist Assistant Treatment Note     Name: Raghav Zhou  Clinic Number: [id number]    Therapy Diagnosis:   Encounter Diagnosis   Name Primary?    Pain Yes     Physician: Lazaro Sanchez MD    Visit Date: 5/9/2022    Physician Orders: PT Eval and Treat   Medical Diagnosis from Referral:Cervical (neck) region somatic dysfunction  Evaluation Date: 5/2/2022  Authorization Period Expiration: 12/31/2022  Plan of Care Expiration:7/15/22  Visit # / Visits authorized: 2/ 25 + eval  FOTO: 1/3   Precautions: Standard, Standard    PTA Visit #: 1/5     Time In:3:15  Time Out: 4:08  Total Billable Time: 53 minutes    SUBJECTIVE     Pt reports: doing well overall  He was compliant with home exercise program.  Response to previous treatment:no issues  Functional change: improved sleeping and use of his arm due to less pain    Pain: 0/10  Location: none today     OBJECTIVE     Objective Measures updated at progress report unless specified.     Treatment     Raghav received the treatments listed below:      therapeutic exercises to develop strength, endurance, ROM, flexibility, posture and core stabilization for 39 minutes including:    UBE standing backward for postural extension and shoulder mobility 4 min  Posture education in sitting and standing/ body mechanics  Open books 1 x 15 reps B  Alternating serratus punches with 3# 2 x 15 reps  Prone I's 2x10  Prone T's 2x10  Shoulder extension with 3# 2 x 15 reps  High rows 30# 2 x 15 reps  Chest pulls with G TB 2 x 10 reps  Diagonals (D 2)  with G TB 1 x 15 reps B  Wall push ups with a plus 2 x 10 reps  Pectoralis stretch in supine/ corner wall stretch using a lunge  Standing IYT w/blue cords x10    manual therapy techniques: Joint mobilizations and Myofacial release were applied for 14 minutes, including:  Cervical distraction/nodding  STM upper traps, subscap, periscapular muscles  L pectoralis release  L GHJ mobs  L Scap  mobs    Patient Education and Home Exercises     Home Exercises Provided and Patient Education Provided     Education provided:   - Posture education in sitting and standing  - Body mechanics    Written Home Exercises Provided: Patient instructed to cont prior HEP. Exercises were reviewed and Raghav was able to demonstrate them prior to the end of the session.  Raghav demonstrated good  understanding of the education provided. See EMR under Patient Instructions for exercises provided during therapy sessions    ASSESSMENT     Raghav presented today without pain and reported improved tolerance to sleeping and activities as his pain has decreased. Today, continued work with strengthening and stabilization. Addition of some prone work today for scapular stabilization/strength. Noted multiple small trigger points along the vertebral border of his left scapula. Raghav tends to pull in his upper traps with some of his activities and requires cueing for appropriate muscle firing. Raghav demonstrated a positive response to today's session as evidenced with his activity tolerance/progression and no reported pain.     Raghav Is progressing well towards his goals.   Pt prognosis is Fair.     Pt will continue to benefit from skilled outpatient physical therapy to address the deficits listed in the problem list box on initial evaluation, provide pt/family education and to maximize pt's level of independence in the home and community environment.     Pt's spiritual, cultural and educational needs considered and pt agreeable to plan of care and goals.     Anticipated barriers to physical therapy: pain, chronicity of problem, work demands    Goals: STG's 2 weeks  1. Patient will be independent with 50% of HEP Progressing     LTG's 10  weeks  1.Patient will improve FOTO disability score  to 32 %  disability or less in order to improve overall QOL & return to PLOF   2. Patient will report an overall decrease in pain with ADL's and  functional mobility  3.Patient will increase strength by at least 1/2 muscle grade in affected musculature to   improve functional mobility  4. Patient will improve L  Cervical rotation  ROM by 10 degrees to improve functional mobility and ADL's  5.Patient will be more aware of posture throughout the day to reduce stress  and maintain optimal alignment of the spine  6. Patient will be independent with HEP  7. Patient will have no difficulty reaching a shelf at shoulder height    PLAN     Plan of care Certification: 5/2/2022 to 7/15/22.     Outpatient Physical Therapy 2 times weekly for 10 weeks to include the following interventions: Cervical/Lumbar Traction, Electrical Stimulation PRN, Manual Therapy, Moist Heat/ Ice, Patient Education, Self Care, Therapeutic Activities, Therapeutic Exercise and Ultrasound, ASTYM, Kinesiotaping PRN, Functional Dry Needling    Jesica Rodriguez, PTA

## 2022-05-12 ENCOUNTER — CLINICAL SUPPORT (OUTPATIENT)
Dept: REHABILITATION | Facility: HOSPITAL | Age: 67
End: 2022-05-12
Payer: MEDICARE

## 2022-05-12 DIAGNOSIS — R52 PAIN: Primary | ICD-10-CM

## 2022-05-12 PROCEDURE — 97110 THERAPEUTIC EXERCISES: CPT

## 2022-05-12 NOTE — PROGRESS NOTES
[facility]   Physical Therapist  Treatment Note     Name: Raghav Zhou  Clinic Number: [id number]    Therapy Diagnosis:   Encounter Diagnosis   Name Primary?    Pain Yes     Physician: Lazaro Sanchez MD    Visit Date: 5/12/2022    Physician Orders: PT Eval and Treat   Medical Diagnosis from Referral:Cervical (neck) region somatic dysfunction  Evaluation Date: 5/2/2022  Authorization Period Expiration: 12/31/2022  Plan of Care Expiration:7/15/22  Visit # / Visits authorized: 3/ 25 + eval  FOTO: 1/3   Precautions: Standard, Standard    PTA Visit #: 0/5     Time In:3:15  Time Out: 4:00  Total Billable Time:45 minutes    SUBJECTIVE     Pt reports: he is feeling better  He was compliant with home exercise program.  Response to previous treatment: good  Functional change: improved sleeping and use of his arm due to less pain    Pain: 0/10  Location: none today     OBJECTIVE     Objective Measures updated at progress report unless specified.     Treatment     Raghav received the treatments listed below:      therapeutic exercises to develop strength, endurance, ROM, flexibility, posture and core stabilization for 45 minutes including:  Review HEP chin tucks, shoulder rolls, scapula squeezes  UBE for upright posture 3' F/ 3' B  Posture education in sitting and standing/ body mechanics ongoing  Prone rows 3# 2 x 10 reps B   Prone thumbs up and down B 2 x 10 reps  Open books 1 x 15 reps B  Serratus punches with 3# 2 x 15 reps  Shoulder extension with 4# 2 x 15 reps  Rows 30# 2 x 15 reps  Chest pulls with G TB 2 x 10 reps NP  Diagonals with G TB 1 x 15 reps B NP  Wall push ups with a plus 2 x 10 reps  Pectoralis stretch in supine/ corner wall stretch NP  Supine pec stretch 3 x 30 secs    manual therapy techniques: Joint mobilizations and Myofacial release were applied for 0 minutes, including:  Joint mobs  MFR  Scapula mobs/ RS  Pectoralis stretching    Patient Education and Home Exercises      Home Exercises Provided and Patient Education Provided     Education provided:   - Posture education in sitting and standing  - Body mechanics    Written Home Exercises Provided: Patient instructed to cont prior HEP. Exercises were reviewed and Raghav was able to demonstrate them prior to the end of the session.  Raghav demonstrated good  understanding of the education provided. See EMR under Patient Instructions for exercises provided during therapy sessions    ASSESSMENT     Raghav presented today with no pain complaints. He is progressing with exercises. Rhomboid and mid trap strengthening added today with some difficulty. Patient fatigued with new exercises. Patient tolerated treatment well with no increase in pain or symptoms.    Raghav Is progressing well towards his goals.   Pt prognosis is Fair.     Pt will continue to benefit from skilled outpatient physical therapy to address the deficits listed in the problem list box on initial evaluation, provide pt/family education and to maximize pt's level of independence in the home and community environment.     Pt's spiritual, cultural and educational needs considered and pt agreeable to plan of care and goals.     Anticipated barriers to physical therapy: pain, chronicity of problem, work demands    Goals: STG's 2 weeks  1. Patient will be independent with 50% of HEP Progressing     LTG's 10  weeks  1.Patient will improve FOTO disability score  to 32 %  disability or less in order to improve overall QOL & return to PLOF   2. Patient will report an overall decrease in pain with ADL's and functional mobility  3.Patient will increase strength by at least 1/2 muscle grade in affected musculature to   improve functional mobility  4. Patient will improve L  Cervical rotation  ROM by 10 degrees to improve functional mobility and ADL's  5.Patient will be more aware of posture throughout the day to reduce stress  and maintain optimal alignment of the spine  6. Patient  will be independent with HEP  7. Patient will have no difficulty reaching a shelf at shoulder height    PLAN     Plan of care Certification: 5/2/2022 to 7/15/22.     Outpatient Physical Therapy 2 times weekly for 10 weeks to include the following interventions: Cervical/Lumbar Traction, Electrical Stimulation PRN, Manual Therapy, Moist Heat/ Ice, Patient Education, Self Care, Therapeutic Activities, Therapeutic Exercise and Ultrasound, ASTYM, Kinesiotaping PRN, Functional Dry Needling    Celestina Bell, PT

## 2022-05-23 ENCOUNTER — HOSPITAL ENCOUNTER (OUTPATIENT)
Dept: RADIOLOGY | Facility: HOSPITAL | Age: 67
Discharge: HOME | End: 2022-05-23
Attending: INTERNAL MEDICINE
Payer: MEDICARE

## 2022-05-23 ENCOUNTER — HOSPITAL ENCOUNTER (OUTPATIENT)
Dept: CARDIOLOGY | Facility: HOSPITAL | Age: 67
Discharge: HOME | End: 2022-05-23
Attending: INTERNAL MEDICINE
Payer: MEDICARE

## 2022-05-23 VITALS
SYSTOLIC BLOOD PRESSURE: 138 MMHG | BODY MASS INDEX: 31.44 KG/M2 | WEIGHT: 245 LBS | DIASTOLIC BLOOD PRESSURE: 76 MMHG | HEIGHT: 74 IN

## 2022-05-23 DIAGNOSIS — E11.59 HYPERTENSION ASSOCIATED WITH DIABETES: ICD-10-CM

## 2022-05-23 DIAGNOSIS — I15.2 HYPERTENSION ASSOCIATED WITH DIABETES: ICD-10-CM

## 2022-05-23 DIAGNOSIS — Z82.49 FAMILY HISTORY OF CARDIOVASCULAR DISEASE: ICD-10-CM

## 2022-05-23 DIAGNOSIS — R06.09 DOE (DYSPNEA ON EXERTION): ICD-10-CM

## 2022-05-23 DIAGNOSIS — R94.31 ABNORMAL ECG: ICD-10-CM

## 2022-05-23 DIAGNOSIS — E11.9 TYPE 2 DIABETES MELLITUS WITHOUT COMPLICATION, WITHOUT LONG-TERM CURRENT USE OF INSULIN: ICD-10-CM

## 2022-05-23 DIAGNOSIS — R94.31 ABNORMAL EKG: ICD-10-CM

## 2022-05-23 LAB
AORTIC ROOT ANNULUS: 3.86 CM
AV INDEX (PROSTH): 0.71
AV MEAN GRADIENT: 6 MMHG
AV PEAK GRADIENT: 10 MMHG
AV VALVE AREA: 2.15 CM2
AV VELOCITY RATIO: 0.76
BSA FOR ECHO PROCEDURE: 2.41 M2
CV ECHO LV RWT: 0.59 CM
DOP CALC AO PEAK VEL: 1.62 M/S
DOP CALC AO VTI: 35.5 CM
DOP CALC LVOT AREA: 3 CM2
DOP CALC LVOT DIAMETER: 1.97 CM
DOP CALC LVOT PEAK VEL: 1.23 M/S
DOP CALC LVOT STROKE VOLUME: 76.47 CM3
DOP CALC RVOT PEAK VEL: 0.52 M/S
DOP CALC RVOT VTI: 11.6 CM
DOP CALCLVOT PEAK VEL VTI: 25.1 CM
E WAVE DECELERATION TIME: 245.96 MSEC
E/A RATIO: 0.81
E/E' RATIO: 6.29 M/S
ECHO LV POSTERIOR WALL: 1 CM (ref 0.6–1.1)
EJECTION FRACTION: 55 %
FRACTIONAL SHORTENING: 21 % (ref 28–44)
INTERVENTRICULAR SEPTUM: 1.19 CM (ref 0.6–1.1)
IVRT: 88.49 MSEC
LA MAJOR: 4.23 CM
LA MINOR: 4.8 CM
LA WIDTH: 2.9 CM
LEFT ATRIUM SIZE: 3.57 CM
LEFT ATRIUM VOLUME INDEX MOD: 18.1 ML/M2
LEFT ATRIUM VOLUME INDEX: 16.7 ML/M2
LEFT ATRIUM VOLUME MOD: 43 CM3
LEFT ATRIUM VOLUME: 39.57 CM3
LEFT INTERNAL DIMENSION IN SYSTOLE: 2.67 CM (ref 2.1–4)
LEFT VENTRICLE DIASTOLIC VOLUME INDEX: 19.82 ML/M2
LEFT VENTRICLE DIASTOLIC VOLUME: 46.97 ML
LEFT VENTRICLE MASS INDEX: 48 G/M2
LEFT VENTRICLE SYSTOLIC VOLUME INDEX: 11.1 ML/M2
LEFT VENTRICLE SYSTOLIC VOLUME: 26.38 ML
LEFT VENTRICULAR INTERNAL DIMENSION IN DIASTOLE: 3.39 CM (ref 3.5–6)
LEFT VENTRICULAR MASS: 112.74 G
LV LATERAL E/E' RATIO: 5.08 M/S
LV SEPTAL E/E' RATIO: 8.25 M/S
LVOT MG: 3.12 MMHG
LVOT MV: 0.82 CM/S
MV PEAK A VEL: 0.81 M/S
MV PEAK E VEL: 0.66 M/S
MV STENOSIS PRESSURE HALF TIME: 71.33 MS
MV VALVE AREA P 1/2 METHOD: 3.08 CM2
PISA TR MAX VEL: 2.9 M/S
PULM VEIN S/D RATIO: 1.86
PV MEAN GRADIENT: 0.5 MMHG
PV PEAK D VEL: 0.42 M/S
PV PEAK S VEL: 0.78 M/S
PV PEAK VELOCITY: 1.25 CM/S
RA MAJOR: 4.08 CM
RA PRESSURE: 3 MMHG
RA WIDTH: 2.6 CM
RIGHT VENTRICULAR END-DIASTOLIC DIMENSION: 2.35 CM
SINUS: 3.04 CM
STJ: 2.67 CM
TDI LATERAL: 0.13 M/S
TDI SEPTAL: 0.08 M/S
TDI: 0.11 M/S
TR MAX PG: 34 MMHG
TRICUSPID ANNULAR PLANE SYSTOLIC EXCURSION: 2.89 CM
TV REST PULMONARY ARTERY PRESSURE: 37 MMHG

## 2022-05-23 PROCEDURE — 93016 STRESS TEST WITH MYOCARDIAL PERFUSION (CUPID ONLY): ICD-10-PCS | Mod: ,,, | Performed by: STUDENT IN AN ORGANIZED HEALTH CARE EDUCATION/TRAINING PROGRAM

## 2022-05-23 PROCEDURE — 93017 CV STRESS TEST TRACING ONLY: CPT

## 2022-05-23 PROCEDURE — 93018 STRESS TEST WITH MYOCARDIAL PERFUSION (CUPID ONLY): ICD-10-PCS | Mod: ,,, | Performed by: STUDENT IN AN ORGANIZED HEALTH CARE EDUCATION/TRAINING PROGRAM

## 2022-05-23 PROCEDURE — 63600175 PHARM REV CODE 636 W HCPCS: Performed by: INTERNAL MEDICINE

## 2022-05-23 PROCEDURE — A9502 TC99M TETROFOSMIN: HCPCS

## 2022-05-23 PROCEDURE — 93306 ECHO (CUPID ONLY): ICD-10-PCS | Mod: 26,,, | Performed by: STUDENT IN AN ORGANIZED HEALTH CARE EDUCATION/TRAINING PROGRAM

## 2022-05-23 PROCEDURE — 78452 HT MUSCLE IMAGE SPECT MULT: CPT | Mod: 26,,, | Performed by: STUDENT IN AN ORGANIZED HEALTH CARE EDUCATION/TRAINING PROGRAM

## 2022-05-23 PROCEDURE — 93306 TTE W/DOPPLER COMPLETE: CPT | Mod: 26,,, | Performed by: STUDENT IN AN ORGANIZED HEALTH CARE EDUCATION/TRAINING PROGRAM

## 2022-05-23 PROCEDURE — 93018 CV STRESS TEST I&R ONLY: CPT | Mod: ,,, | Performed by: STUDENT IN AN ORGANIZED HEALTH CARE EDUCATION/TRAINING PROGRAM

## 2022-05-23 PROCEDURE — 93016 CV STRESS TEST SUPVJ ONLY: CPT | Mod: ,,, | Performed by: STUDENT IN AN ORGANIZED HEALTH CARE EDUCATION/TRAINING PROGRAM

## 2022-05-23 PROCEDURE — 78452 STRESS TEST WITH MYOCARDIAL PERFUSION (CUPID ONLY): ICD-10-PCS | Mod: 26,,, | Performed by: STUDENT IN AN ORGANIZED HEALTH CARE EDUCATION/TRAINING PROGRAM

## 2022-05-23 PROCEDURE — 93306 TTE W/DOPPLER COMPLETE: CPT

## 2022-05-23 RX ORDER — REGADENOSON 0.08 MG/ML
0.4 INJECTION, SOLUTION INTRAVENOUS ONCE
Status: COMPLETED | OUTPATIENT
Start: 2022-05-23 | End: 2022-05-23

## 2022-05-23 RX ADMIN — REGADENOSON 0.4 MG: 0.08 INJECTION, SOLUTION INTRAVENOUS at 02:05

## 2022-05-24 LAB
CV STRESS BASE HR: 68 BPM
DIASTOLIC BLOOD PRESSURE: 76 MMHG
NUC REST DIASTOLIC VOLUME INDEX: 85
NUC REST EJECTION FRACTION: 75
NUC REST SYSTOLIC VOLUME INDEX: 21
NUC STRESS DIASTOLIC VOLUME INDEX: 88
NUC STRESS EJECTION FRACTION: 73 %
NUC STRESS SYSTOLIC VOLUME INDEX: 24
OHS CV CPX 85 PERCENT MAX PREDICTED HEART RATE MALE: 132
OHS CV CPX ESTIMATED METS: 1
OHS CV CPX MAX PREDICTED HEART RATE: 155
OHS CV CPX PATIENT IS FEMALE: 0
OHS CV CPX PATIENT IS MALE: 1
OHS CV CPX PEAK DIASTOLIC BLOOD PRESSURE: 84 MMHG
OHS CV CPX PEAK HEAR RATE: 101 BPM
OHS CV CPX PEAK RATE PRESSURE PRODUCT: NORMAL
OHS CV CPX PEAK SYSTOLIC BLOOD PRESSURE: 157 MMHG
OHS CV CPX PERCENT MAX PREDICTED HEART RATE ACHIEVED: 65
OHS CV CPX RATE PRESSURE PRODUCT PRESENTING: 8364
STRESS ECHO POST EXERCISE DUR MIN: 1 MINUTES
STRESS ECHO POST EXERCISE DUR SEC: 5 SECONDS
SYSTOLIC BLOOD PRESSURE: 123 MMHG

## 2022-05-25 ENCOUNTER — TELEPHONE (OUTPATIENT)
Dept: CARDIOLOGY | Facility: HOSPITAL | Age: 67
End: 2022-05-25
Payer: MEDICARE

## 2022-05-25 NOTE — TELEPHONE ENCOUNTER
Please call pt.  He passed his nuclear stress test.  No blockages noted.  Echo shows normal heart strength.  Continue current meds and risk factor modification.  Cardiac diet, daily exercise.  F/u w PCP as advised.     F/u w Cardiology prn.     Dr Gomez    No answer lvm and portal message to return call

## 2022-05-25 NOTE — TELEPHONE ENCOUNTER
Please call pt.  He passed his nuclear stress test.  No blockages noted.  Echo shows normal heart strength.  Continue current meds and risk factor modification.  Cardiac diet, daily exercise.  F/u w PCP as advised.    F/u w Cardiology prn.    Dr Gomez

## 2022-06-24 ENCOUNTER — DOCUMENTATION ONLY (OUTPATIENT)
Dept: REHABILITATION | Facility: HOSPITAL | Age: 67
End: 2022-06-24
Payer: MEDICARE

## 2022-12-16 ENCOUNTER — PATIENT MESSAGE (OUTPATIENT)
Dept: RESEARCH | Facility: HOSPITAL | Age: 67
End: 2022-12-16
Payer: MEDICARE

## 2023-05-09 ENCOUNTER — OFFICE VISIT (OUTPATIENT)
Dept: OPHTHALMOLOGY | Facility: CLINIC | Age: 68
End: 2023-05-09
Payer: MEDICARE

## 2023-05-09 DIAGNOSIS — Z46.0 ENCOUNTER FOR FITTING OR ADJUSTMENT OF SPECTACLES OR CONTACT LENSES: ICD-10-CM

## 2023-05-09 DIAGNOSIS — E11.36 DIABETIC CATARACT: ICD-10-CM

## 2023-05-09 DIAGNOSIS — H52.7 REFRACTIVE ERRORS: ICD-10-CM

## 2023-05-09 DIAGNOSIS — E11.9 DIABETES MELLITUS TYPE 2 WITHOUT RETINOPATHY: Primary | ICD-10-CM

## 2023-05-09 PROCEDURE — 99999 PR PBB SHADOW E&M-EST. PATIENT-LVL II: CPT | Mod: PBBFAC,HCWC,, | Performed by: OPTOMETRIST

## 2023-05-09 PROCEDURE — 3044F HG A1C LEVEL LT 7.0%: CPT | Mod: HCWC,CPTII,S$GLB, | Performed by: OPTOMETRIST

## 2023-05-09 PROCEDURE — 3066F NEPHROPATHY DOC TX: CPT | Mod: HCWC,CPTII,S$GLB, | Performed by: OPTOMETRIST

## 2023-05-09 PROCEDURE — 3066F PR DOCUMENTATION OF TREATMENT FOR NEPHROPATHY: ICD-10-PCS | Mod: HCWC,CPTII,S$GLB, | Performed by: OPTOMETRIST

## 2023-05-09 PROCEDURE — 92014 COMPRE OPH EXAM EST PT 1/>: CPT | Mod: HCWC,S$GLB,, | Performed by: OPTOMETRIST

## 2023-05-09 PROCEDURE — 99999 PR PBB SHADOW E&M-EST. PATIENT-LVL II: ICD-10-PCS | Mod: PBBFAC,HCWC,, | Performed by: OPTOMETRIST

## 2023-05-09 PROCEDURE — 92014 PR EYE EXAM, EST PATIENT,COMPREHESV: ICD-10-PCS | Mod: HCWC,S$GLB,, | Performed by: OPTOMETRIST

## 2023-05-09 PROCEDURE — 92015 DETERMINE REFRACTIVE STATE: CPT | Mod: HCWC,S$GLB,, | Performed by: OPTOMETRIST

## 2023-05-09 PROCEDURE — 3061F PR NEG MICROALBUMINURIA RESULT DOCUMENTED/REVIEW: ICD-10-PCS | Mod: HCWC,CPTII,S$GLB, | Performed by: OPTOMETRIST

## 2023-05-09 PROCEDURE — 3044F PR MOST RECENT HEMOGLOBIN A1C LEVEL <7.0%: ICD-10-PCS | Mod: HCWC,CPTII,S$GLB, | Performed by: OPTOMETRIST

## 2023-05-09 PROCEDURE — 3061F NEG MICROALBUMINURIA REV: CPT | Mod: HCWC,CPTII,S$GLB, | Performed by: OPTOMETRIST

## 2023-05-09 PROCEDURE — 92015 PR REFRACTION: ICD-10-PCS | Mod: HCWC,S$GLB,, | Performed by: OPTOMETRIST

## 2023-05-09 PROCEDURE — 1159F PR MEDICATION LIST DOCUMENTED IN MEDICAL RECORD: ICD-10-PCS | Mod: HCWC,CPTII,S$GLB, | Performed by: OPTOMETRIST

## 2023-05-09 PROCEDURE — 1159F MED LIST DOCD IN RCRD: CPT | Mod: HCWC,CPTII,S$GLB, | Performed by: OPTOMETRIST

## 2023-05-09 NOTE — PROGRESS NOTES
HPI    NIDDM exam  No visual complaints  Last eye exam 04/07/2022 TRF.  Update glasses RX.  Lab Results       Component                Value               Date                       LABA1C                   7.1                 02/03/2017                 HGBA1C                   6.4 (H)             01/25/2023              Last edited by Cecile Gómez MA on 5/9/2023  3:00 PM.            Assessment /Plan     For exam results, see Encounter Report.    Diabetes mellitus type 2 without retinopathy    Diabetic cataract    Encounter for fitting or adjustment of spectacles or contact lenses    Refractive errors      No Background Diabetic Retinopathy  Strict BG control, f/u w/ PCP, and annual DFE  Stressed importance of DM control to preserve vision    Mild cataracts OU, not surgical.  Follow annually.    Dispense Final Rx for glasses.  RTC 1 year  Discussed above and answered questions.

## 2023-06-13 ENCOUNTER — OFFICE VISIT (OUTPATIENT)
Dept: OPHTHALMOLOGY | Facility: CLINIC | Age: 68
End: 2023-06-13
Payer: MEDICARE

## 2023-06-13 DIAGNOSIS — H10.33 ACUTE BACTERIAL CONJUNCTIVITIS OF BOTH EYES: Primary | ICD-10-CM

## 2023-06-13 PROCEDURE — 3044F PR MOST RECENT HEMOGLOBIN A1C LEVEL <7.0%: ICD-10-PCS | Mod: HCWC,CPTII,S$GLB, | Performed by: OPTOMETRIST

## 2023-06-13 PROCEDURE — 92012 INTRM OPH EXAM EST PATIENT: CPT | Mod: HCWC,S$GLB,, | Performed by: OPTOMETRIST

## 2023-06-13 PROCEDURE — 3044F HG A1C LEVEL LT 7.0%: CPT | Mod: HCWC,CPTII,S$GLB, | Performed by: OPTOMETRIST

## 2023-06-13 PROCEDURE — 3066F PR DOCUMENTATION OF TREATMENT FOR NEPHROPATHY: ICD-10-PCS | Mod: HCWC,CPTII,S$GLB, | Performed by: OPTOMETRIST

## 2023-06-13 PROCEDURE — 92012 PR EYE EXAM, EST PATIENT,INTERMED: ICD-10-PCS | Mod: HCWC,S$GLB,, | Performed by: OPTOMETRIST

## 2023-06-13 PROCEDURE — 1159F MED LIST DOCD IN RCRD: CPT | Mod: HCWC,CPTII,S$GLB, | Performed by: OPTOMETRIST

## 2023-06-13 PROCEDURE — 1159F PR MEDICATION LIST DOCUMENTED IN MEDICAL RECORD: ICD-10-PCS | Mod: HCWC,CPTII,S$GLB, | Performed by: OPTOMETRIST

## 2023-06-13 PROCEDURE — 3066F NEPHROPATHY DOC TX: CPT | Mod: HCWC,CPTII,S$GLB, | Performed by: OPTOMETRIST

## 2023-06-13 PROCEDURE — 3061F PR NEG MICROALBUMINURIA RESULT DOCUMENTED/REVIEW: ICD-10-PCS | Mod: HCWC,CPTII,S$GLB, | Performed by: OPTOMETRIST

## 2023-06-13 PROCEDURE — 3061F NEG MICROALBUMINURIA REV: CPT | Mod: HCWC,CPTII,S$GLB, | Performed by: OPTOMETRIST

## 2023-06-13 RX ORDER — NEOMYCIN SULFATE, POLYMYXIN B SULFATE AND DEXAMETHASONE 3.5; 10000; 1 MG/ML; [USP'U]/ML; MG/ML
1 SUSPENSION/ DROPS OPHTHALMIC 4 TIMES DAILY
Qty: 10 ML | Refills: 0 | Status: SHIPPED | OUTPATIENT
Start: 2023-06-13 | End: 2023-06-26

## 2023-06-13 NOTE — PROGRESS NOTES
HPI     Diabetic Eye Exam     Additional comments: Lab Results       Component                Value               Date                       LABA1C                   7.1                 02/03/2017                 HGBA1C                   6.4 (H)             01/25/2023                         Comments    Alternating eye irritation x 2 weeks, mainly left eye today  Using telehealth suggested Rx drops did not help  AT do not help          Last edited by Rishi Banerjee, OD on 6/13/2023 10:13 AM.            Assessment /Plan     For exam results, see Encounter Report.    Acute bacterial conjunctivitis of both eyes  -     neomycin-polymyxin-dexamethasone (MAXITROL) 3.5mg/mL-10,000 unit/mL-0.1 % DrpS; Place 1 drop into both eyes 4 (four) times daily. for 10 days  Dispense: 10 mL; Refill: 0      No K ulcer present.    D/C SCL until treatment is complete.    RTC prn worsening symptoms

## 2024-01-29 PROBLEM — F32.5 MAJOR DEPRESSIVE DISORDER WITH SINGLE EPISODE, IN FULL REMISSION: Status: ACTIVE | Noted: 2024-01-29

## 2024-10-01 ENCOUNTER — PATIENT MESSAGE (OUTPATIENT)
Dept: OPHTHALMOLOGY | Facility: CLINIC | Age: 69
End: 2024-10-01

## 2024-10-01 ENCOUNTER — OFFICE VISIT (OUTPATIENT)
Dept: OPHTHALMOLOGY | Facility: CLINIC | Age: 69
End: 2024-10-01
Payer: MEDICARE

## 2024-10-01 DIAGNOSIS — Z46.0 ENCOUNTER FOR FITTING OR ADJUSTMENT OF SPECTACLES OR CONTACT LENSES: ICD-10-CM

## 2024-10-01 DIAGNOSIS — E11.36 DIABETIC CATARACT: ICD-10-CM

## 2024-10-01 DIAGNOSIS — E11.59 HYPERTENSION ASSOCIATED WITH DIABETES: ICD-10-CM

## 2024-10-01 DIAGNOSIS — H52.7 REFRACTIVE ERRORS: ICD-10-CM

## 2024-10-01 DIAGNOSIS — E11.9 DIABETES MELLITUS TYPE 2 WITHOUT RETINOPATHY: Primary | ICD-10-CM

## 2024-10-01 DIAGNOSIS — I15.2 HYPERTENSION ASSOCIATED WITH DIABETES: ICD-10-CM

## 2024-10-01 PROCEDURE — 99999 PR PBB SHADOW E&M-EST. PATIENT-LVL III: CPT | Mod: PBBFAC,HCWC,, | Performed by: OPTOMETRIST

## 2025-01-30 ENCOUNTER — TELEPHONE (OUTPATIENT)
Dept: PULMONOLOGY | Facility: CLINIC | Age: 70
End: 2025-01-30
Payer: MEDICARE

## 2025-01-30 PROBLEM — I27.20 PULMONARY HYPERTENSION: Status: ACTIVE | Noted: 2025-01-30

## 2025-01-30 NOTE — TELEPHONE ENCOUNTER
----- Message from Tech Estefania sent at 1/30/2025 10:52 AM CST -----  [facility]

## 2025-02-18 ENCOUNTER — HOSPITAL ENCOUNTER (OUTPATIENT)
Dept: SLEEP MEDICINE | Facility: HOSPITAL | Age: 70
Discharge: HOME | End: 2025-02-18
Attending: FAMILY MEDICINE
Payer: MEDICARE

## 2025-02-18 DIAGNOSIS — F51.12 INSUFFICIENT SLEEP SYNDROME: ICD-10-CM

## 2025-02-18 NOTE — PROCEDURES
PHYSICIAN INTERPRETATION AND COMMENTS: Findings are consistent with moderate, positional obstructive sleep  apnea(LUZ).  CLINICAL HISTORY: 69 year old male at risk for LUZ, BM2 32Kg/m2.  SLEEP STUDY FINDINGS: Patient underwent a 1 night Home Sleep Test and by behavioral criteria, slept for approximately  6.84 hours, with a sleep latency of 6 minutes and a sleep efficiency of 99%. Moderate sleep disordered breathing (AHI=22)  is noted based on a 4% hypopnea desaturation criteria, predominantly in the supine position (68 events/hour). The patient  slept supine 14.4% of the night based on valid recording time of 6.88 hours and is 4.5 times as likely to have  apneas/hypopneas when supine. When considering more subtle measures of sleep disordered breathing, the overall  respiratory disturbance index is severe (RDI=31) based on a 1% hypopnea desaturation criteria with confirmation by  surrogate arousal indicators. The apneas/hypopneas are accompanied by minimal oxygen desaturation (percent time  below 90% SpO2: 5%, Min SpO2: 85.5%). The average desaturation across all sleep disordered breathing events is 3.3%.  Snoring occurs for 10.8% (30 dB) of the study, 4.1% is very loud. The mean pulse rate is 58 BPM, with infrequent pulse rate  variability (24 events with >= 6 BPM increase/decrease per hour).  TREATMENT CONSIDERATIONS: Positive airway pressure (PAP) is the first line therapy for obstructive sleep apnea. Given the  severity of obstructive sleep apnea and extent/duration of desaturations an in-lab CPAP titration is recommended. If a  titration study is not pursued, a trial of CPAP therapy 5-20 cm can be initiated. A supervised weight loss program would  likely be of benefit the weight is likely contributing to the sleep apnea. Positive airway pressure (PAP) is the first line  therapy for obstructive sleep apnea.  DISEASE MANAGEMENT CONSIDERATIONS: Patient can be seen if needed at the [facility], [facility]  [facility] : [address] [facility] [address]; Phone Number [phone] or [id number] [place] [address] Phone Number [phone].

## 2025-02-20 ENCOUNTER — PATIENT MESSAGE (OUTPATIENT)
Dept: PULMONOLOGY | Facility: CLINIC | Age: 70
End: 2025-02-20
Payer: MEDICARE

## 2025-02-21 PROBLEM — G47.33 OSA (OBSTRUCTIVE SLEEP APNEA): Status: ACTIVE | Noted: 2025-02-21

## 2025-03-12 ENCOUNTER — TELEPHONE (OUTPATIENT)
Dept: PULMONOLOGY | Facility: CLINIC | Age: 70
End: 2025-03-12
Payer: MEDICARE